# Patient Record
Sex: FEMALE | Race: WHITE
[De-identification: names, ages, dates, MRNs, and addresses within clinical notes are randomized per-mention and may not be internally consistent; named-entity substitution may affect disease eponyms.]

---

## 2017-02-09 ENCOUNTER — HOSPITAL ENCOUNTER (OUTPATIENT)
Dept: HOSPITAL 80 - BMCIMAGING | Age: 62
End: 2017-02-09
Attending: GENERAL ACUTE CARE HOSPITAL
Payer: COMMERCIAL

## 2017-02-09 DIAGNOSIS — Z12.31: Primary | ICD-10-CM

## 2017-02-09 PROCEDURE — G0202 SCR MAMMO BI INCL CAD: HCPCS

## 2017-02-09 NOTE — MA
Screening Digital Mammogram



Clinical Indications: Routine screening.



Technique:  Standard cephalocaudal and mediolateral oblique projections are obtained. An additional c
c view is performed of the left breast. This examination is processed by the [facility] computer aided dete
ction system. 



Comparison: October 2014, October 2013, September 2012 and August 2011



Breast density: B; There are scattered fibroglandular densities.



Findings: CAD was reviewed.  No suspicious findings are identified.



Impression: Negative mammogram. . 



BI-RADS 1.



Recommendation:   Routine screening is recommended in one year.



[facility] will send a result letter to the patient.



Negative mammography should not preclude additional workup of a clinically suspicious finding.



The patient's information is entered into a reminder system with a target due date for her next mammo
gram.

## 2018-05-04 ENCOUNTER — HOSPITAL ENCOUNTER (OUTPATIENT)
Dept: HOSPITAL 80 - BMCIMAGING | Age: 63
End: 2018-05-04
Attending: INTERNAL MEDICINE
Payer: COMMERCIAL

## 2018-05-04 DIAGNOSIS — Z12.31: Primary | ICD-10-CM

## 2019-06-06 ENCOUNTER — HOSPITAL ENCOUNTER (OUTPATIENT)
Dept: HOSPITAL 80 - BMCIMAGING | Age: 64
End: 2019-06-06
Payer: COMMERCIAL